# Patient Record
Sex: MALE | Race: WHITE | URBAN - METROPOLITAN AREA
[De-identification: names, ages, dates, MRNs, and addresses within clinical notes are randomized per-mention and may not be internally consistent; named-entity substitution may affect disease eponyms.]

---

## 2023-02-20 ENCOUNTER — OFFICE VISIT (OUTPATIENT)
Dept: URBAN - METROPOLITAN AREA CLINIC 58 | Facility: CLINIC | Age: 88
End: 2023-02-20
Payer: MEDICARE

## 2023-02-20 DIAGNOSIS — H25.12 AGE-RELATED NUCLEAR CATARACT, LEFT EYE: Primary | ICD-10-CM

## 2023-02-20 DIAGNOSIS — H26.491 OTHER SECONDARY CATARACT, RIGHT EYE: ICD-10-CM

## 2023-02-20 PROCEDURE — 99204 OFFICE O/P NEW MOD 45 MIN: CPT | Performed by: OPHTHALMOLOGY

## 2023-02-20 ASSESSMENT — VISUAL ACUITY
OS: 20/20
OD: 20/20

## 2023-02-20 ASSESSMENT — KERATOMETRY: OS: 43.63

## 2023-02-20 ASSESSMENT — INTRAOCULAR PRESSURE
OD: 18
OS: 16

## 2023-02-20 NOTE — IMPRESSION/PLAN
Impression: Age-related nuclear cataract, left eye: H25.12. Plan: Cataracts account for the patient's complaints. Discussed all risks, benefits, procedures and recovery for cataract surgery in detail with the patient. Patient understands changing glasses will not improve vision. Patient desires to have surgery, recommend phacoemulsification with intraocular lens implant. Discussed lens implant options. Recommend TORIC lens with a distance refractive goal.  Discussed that glasses will still be needed at least for near after cataract surgery and that no guarantee of refractive result is given. Patient is Dextenza candidate.  RL2.

## 2023-02-20 NOTE — IMPRESSION/PLAN
Impression: Other secondary cataract, right eye: H26.491. Plan: Discussed diagnosis in detail with patient. No treatment is required at this time. Will continue to observe condition and or symptoms. Call if [address] worsens.